# Patient Record
Sex: MALE | Race: WHITE
[De-identification: names, ages, dates, MRNs, and addresses within clinical notes are randomized per-mention and may not be internally consistent; named-entity substitution may affect disease eponyms.]

---

## 2018-08-06 ENCOUNTER — HOSPITAL ENCOUNTER (INPATIENT)
Dept: HOSPITAL 47 - EC | Age: 58
LOS: 2 days | Discharge: HOME | DRG: 184 | End: 2018-08-08
Attending: SURGERY | Admitting: SURGERY
Payer: MEDICARE

## 2018-08-06 DIAGNOSIS — G89.4: ICD-10-CM

## 2018-08-06 DIAGNOSIS — Z79.891: ICD-10-CM

## 2018-08-06 DIAGNOSIS — Z82.5: ICD-10-CM

## 2018-08-06 DIAGNOSIS — M48.061: ICD-10-CM

## 2018-08-06 DIAGNOSIS — K21.9: ICD-10-CM

## 2018-08-06 DIAGNOSIS — S22.42XA: ICD-10-CM

## 2018-08-06 DIAGNOSIS — Z79.899: ICD-10-CM

## 2018-08-06 DIAGNOSIS — M19.042: ICD-10-CM

## 2018-08-06 DIAGNOSIS — I10: ICD-10-CM

## 2018-08-06 DIAGNOSIS — Z79.82: ICD-10-CM

## 2018-08-06 DIAGNOSIS — F17.200: ICD-10-CM

## 2018-08-06 DIAGNOSIS — Z96.643: ICD-10-CM

## 2018-08-06 DIAGNOSIS — M19.041: ICD-10-CM

## 2018-08-06 DIAGNOSIS — R40.2362: ICD-10-CM

## 2018-08-06 DIAGNOSIS — R40.2142: ICD-10-CM

## 2018-08-06 DIAGNOSIS — S00.83XA: ICD-10-CM

## 2018-08-06 DIAGNOSIS — Z88.2: ICD-10-CM

## 2018-08-06 DIAGNOSIS — S22.22XA: Primary | ICD-10-CM
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
## 2018-08-06 DIAGNOSIS — N40.0: ICD-10-CM

## 2018-08-06 DIAGNOSIS — R40.2252: ICD-10-CM
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
## 2018-08-06 LAB
ALBUMIN SERPL-MCNC: 4.1 G/DL (ref 3.5–5)
ALP SERPL-CCNC: 60 U/L (ref 38–126)
ALT SERPL-CCNC: 40 U/L (ref 21–72)
ANION GAP SERPL CALC-SCNC: 7 MMOL/L
APTT BLD: 22.3 SEC (ref 22–30)
AST SERPL-CCNC: 38 U/L (ref 17–59)
BASOPHILS # BLD AUTO: 0 K/UL (ref 0–0.2)
BASOPHILS NFR BLD AUTO: 0 %
BUN SERPL-SCNC: 14 MG/DL (ref 9–20)
CALCIUM SPEC-MCNC: 9.2 MG/DL (ref 8.4–10.2)
CHLORIDE SERPL-SCNC: 103 MMOL/L (ref 98–107)
CK SERPL-CCNC: 351 U/L (ref 55–170)
CK SERPL-CCNC: 522 U/L (ref 55–170)
CK SERPL-CCNC: 564 U/L (ref 55–170)
CO2 SERPL-SCNC: 29 MMOL/L (ref 22–30)
EOSINOPHIL # BLD AUTO: 0.1 K/UL (ref 0–0.7)
EOSINOPHIL NFR BLD AUTO: 1 %
ERYTHROCYTE [DISTWIDTH] IN BLOOD BY AUTOMATED COUNT: 5.04 M/UL (ref 4.3–5.9)
ERYTHROCYTE [DISTWIDTH] IN BLOOD: 16.8 % (ref 11.5–15.5)
GLUCOSE BLD-MCNC: 85 MG/DL (ref 75–99)
GLUCOSE SERPL-MCNC: 88 MG/DL (ref 74–99)
HCT VFR BLD AUTO: 43.2 % (ref 39–53)
HGB BLD-MCNC: 14 GM/DL (ref 13–17.5)
INR PPP: 1 (ref ?–1.2)
LYMPHOCYTES # SPEC AUTO: 0.8 K/UL (ref 1–4.8)
LYMPHOCYTES NFR SPEC AUTO: 7 %
MCH RBC QN AUTO: 27.8 PG (ref 25–35)
MCHC RBC AUTO-ENTMCNC: 32.4 G/DL (ref 31–37)
MCV RBC AUTO: 85.8 FL (ref 80–100)
MONOCYTES # BLD AUTO: 0.8 K/UL (ref 0–1)
MONOCYTES NFR BLD AUTO: 7 %
NEUTROPHILS # BLD AUTO: 9.4 K/UL (ref 1.3–7.7)
NEUTROPHILS NFR BLD AUTO: 84 %
PLATELET # BLD AUTO: 199 K/UL (ref 150–450)
POTASSIUM SERPL-SCNC: 4.3 MMOL/L (ref 3.5–5.1)
PROT SERPL-MCNC: 7.1 G/DL (ref 6.3–8.2)
PT BLD: 10.1 SEC (ref 9–12)
SODIUM SERPL-SCNC: 139 MMOL/L (ref 137–145)
TROPONIN I SERPL-MCNC: 0.03 NG/ML (ref 0–0.03)
TROPONIN I SERPL-MCNC: 0.04 NG/ML (ref 0–0.03)
TROPONIN I SERPL-MCNC: <0.012 NG/ML (ref 0–0.03)
WBC # BLD AUTO: 11.2 K/UL (ref 3.8–10.6)

## 2018-08-06 PROCEDURE — 72125 CT NECK SPINE W/O DYE: CPT

## 2018-08-06 PROCEDURE — 71045 X-RAY EXAM CHEST 1 VIEW: CPT

## 2018-08-06 PROCEDURE — 93306 TTE W/DOPPLER COMPLETE: CPT

## 2018-08-06 PROCEDURE — 80320 DRUG SCREEN QUANTALCOHOLS: CPT

## 2018-08-06 PROCEDURE — 72170 X-RAY EXAM OF PELVIS: CPT

## 2018-08-06 PROCEDURE — 71260 CT THORAX DX C+: CPT

## 2018-08-06 PROCEDURE — 82550 ASSAY OF CK (CPK): CPT

## 2018-08-06 PROCEDURE — 84484 ASSAY OF TROPONIN QUANT: CPT

## 2018-08-06 PROCEDURE — 36415 COLL VENOUS BLD VENIPUNCTURE: CPT

## 2018-08-06 PROCEDURE — 84100 ASSAY OF PHOSPHORUS: CPT

## 2018-08-06 PROCEDURE — 80048 BASIC METABOLIC PNL TOTAL CA: CPT

## 2018-08-06 PROCEDURE — 86850 RBC ANTIBODY SCREEN: CPT

## 2018-08-06 PROCEDURE — 85025 COMPLETE CBC W/AUTO DIFF WBC: CPT

## 2018-08-06 PROCEDURE — 85730 THROMBOPLASTIN TIME PARTIAL: CPT

## 2018-08-06 PROCEDURE — 86901 BLOOD TYPING SEROLOGIC RH(D): CPT

## 2018-08-06 PROCEDURE — 86900 BLOOD TYPING SEROLOGIC ABO: CPT

## 2018-08-06 PROCEDURE — 93005 ELECTROCARDIOGRAM TRACING: CPT

## 2018-08-06 PROCEDURE — 74177 CT ABD & PELVIS W/CONTRAST: CPT

## 2018-08-06 PROCEDURE — 83735 ASSAY OF MAGNESIUM: CPT

## 2018-08-06 PROCEDURE — 70450 CT HEAD/BRAIN W/O DYE: CPT

## 2018-08-06 PROCEDURE — 80053 COMPREHEN METABOLIC PANEL: CPT

## 2018-08-06 PROCEDURE — 96374 THER/PROPH/DIAG INJ IV PUSH: CPT

## 2018-08-06 PROCEDURE — 82553 CREATINE MB FRACTION: CPT

## 2018-08-06 PROCEDURE — 99291 CRITICAL CARE FIRST HOUR: CPT

## 2018-08-06 PROCEDURE — 85610 PROTHROMBIN TIME: CPT

## 2018-08-06 RX ADMIN — GABAPENTIN SCH MG: 300 CAPSULE ORAL at 20:21

## 2018-08-06 RX ADMIN — HYDROCODONE BITARTRATE AND ACETAMINOPHEN PRN EACH: 10; 325 TABLET ORAL at 16:12

## 2018-08-06 RX ADMIN — MORPHINE SULFATE PRN MG: 4 INJECTION, SOLUTION INTRAMUSCULAR; INTRAVENOUS at 22:52

## 2018-08-06 RX ADMIN — GABAPENTIN SCH MG: 300 CAPSULE ORAL at 16:10

## 2018-08-06 RX ADMIN — MORPHINE SULFATE PRN MG: 4 INJECTION, SOLUTION INTRAMUSCULAR; INTRAVENOUS at 14:32

## 2018-08-06 RX ADMIN — CEFAZOLIN SCH MLS/HR: 330 INJECTION, POWDER, FOR SOLUTION INTRAMUSCULAR; INTRAVENOUS at 15:31

## 2018-08-06 RX ADMIN — MORPHINE SULFATE PRN MG: 4 INJECTION, SOLUTION INTRAMUSCULAR; INTRAVENOUS at 10:21
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
## 2018-08-07 LAB
ANION GAP SERPL CALC-SCNC: 6 MMOL/L
BASOPHILS # BLD AUTO: 0 K/UL (ref 0–0.2)
BASOPHILS NFR BLD AUTO: 0 %
BUN SERPL-SCNC: 12 MG/DL (ref 9–20)
CALCIUM SPEC-MCNC: 8.7 MG/DL (ref 8.4–10.2)
CHLORIDE SERPL-SCNC: 102 MMOL/L (ref 98–107)
CO2 SERPL-SCNC: 29 MMOL/L (ref 22–30)
EOSINOPHIL # BLD AUTO: 0 K/UL (ref 0–0.7)
EOSINOPHIL NFR BLD AUTO: 0 %
ERYTHROCYTE [DISTWIDTH] IN BLOOD BY AUTOMATED COUNT: 4.95 M/UL (ref 4.3–5.9)
ERYTHROCYTE [DISTWIDTH] IN BLOOD: 16.6 % (ref 11.5–15.5)
GLUCOSE SERPL-MCNC: 123 MG/DL (ref 74–99)
HCT VFR BLD AUTO: 43.3 % (ref 39–53)
HGB BLD-MCNC: 13.4 GM/DL (ref 13–17.5)
LYMPHOCYTES # SPEC AUTO: 0.7 K/UL (ref 1–4.8)
LYMPHOCYTES NFR SPEC AUTO: 7 %
MAGNESIUM SPEC-SCNC: 1.8 MG/DL (ref 1.6–2.3)
MCH RBC QN AUTO: 27 PG (ref 25–35)
MCHC RBC AUTO-ENTMCNC: 30.9 G/DL (ref 31–37)
MCV RBC AUTO: 87.4 FL (ref 80–100)
MONOCYTES # BLD AUTO: 0.3 K/UL (ref 0–1)
MONOCYTES NFR BLD AUTO: 3 %
NEUTROPHILS # BLD AUTO: 8.6 K/UL (ref 1.3–7.7)
NEUTROPHILS NFR BLD AUTO: 89 %
PLATELET # BLD AUTO: 173 K/UL (ref 150–450)
POTASSIUM SERPL-SCNC: 4.5 MMOL/L (ref 3.5–5.1)
SODIUM SERPL-SCNC: 137 MMOL/L (ref 137–145)
WBC # BLD AUTO: 9.6 K/UL (ref 3.8–10.6)

## 2018-08-07 RX ADMIN — HYDROCODONE BITARTRATE AND ACETAMINOPHEN PRN EACH: 10; 325 TABLET ORAL at 08:00

## 2018-08-07 RX ADMIN — GABAPENTIN SCH MG: 300 CAPSULE ORAL at 20:32

## 2018-08-07 RX ADMIN — CEFAZOLIN SCH MLS/HR: 330 INJECTION, POWDER, FOR SOLUTION INTRAMUSCULAR; INTRAVENOUS at 18:18

## 2018-08-07 RX ADMIN — HYDROCODONE BITARTRATE AND ACETAMINOPHEN PRN EACH: 10; 325 TABLET ORAL at 22:06

## 2018-08-07 RX ADMIN — HYDROCODONE BITARTRATE AND ACETAMINOPHEN PRN EACH: 10; 325 TABLET ORAL at 14:02

## 2018-08-07 RX ADMIN — MAGNESIUM SULFATE IN DEXTROSE SCH MLS/HR: 10 INJECTION, SOLUTION INTRAVENOUS at 07:49

## 2018-08-07 RX ADMIN — MORPHINE SULFATE SCH MG: 60 TABLET, EXTENDED RELEASE ORAL at 20:32

## 2018-08-07 RX ADMIN — TAMSULOSIN HYDROCHLORIDE SCH MG: 0.4 CAPSULE ORAL at 08:00

## 2018-08-07 RX ADMIN — PANTOPRAZOLE SODIUM SCH MG: 40 INJECTION, POWDER, FOR SOLUTION INTRAVENOUS at 08:00

## 2018-08-07 RX ADMIN — MORPHINE SULFATE PRN MG: 4 INJECTION, SOLUTION INTRAMUSCULAR; INTRAVENOUS at 03:34

## 2018-08-07 RX ADMIN — MAGNESIUM SULFATE IN DEXTROSE SCH MLS/HR: 10 INJECTION, SOLUTION INTRAVENOUS at 06:24

## 2018-08-07 RX ADMIN — TRIAMTERENE AND HYDROCHLOROTHIAZIDE SCH EACH: 25; 37.5 CAPSULE ORAL at 08:00

## 2018-08-07 RX ADMIN — GABAPENTIN SCH: 300 CAPSULE ORAL at 18:14

## 2018-08-07 RX ADMIN — MORPHINE SULFATE PRN MG: 4 INJECTION, SOLUTION INTRAMUSCULAR; INTRAVENOUS at 18:14

## 2018-08-07 RX ADMIN — GABAPENTIN SCH MG: 300 CAPSULE ORAL at 08:00

## 2018-08-07 RX ADMIN — CEFAZOLIN SCH MLS/HR: 330 INJECTION, POWDER, FOR SOLUTION INTRAMUSCULAR; INTRAVENOUS at 03:33

## 2018-08-07 RX ADMIN — METOPROLOL SUCCINATE SCH MG: 25 TABLET, EXTENDED RELEASE ORAL at 08:00

## 2018-08-07 NOTE — XR
EXAMINATION TYPE: XR chest 1V

 

DATE OF EXAM: 8/7/2018

 

COMPARISON: Prior chest x-ray and CT 8/6/2018

 

HISTORY: Sternal contusion

 

TECHNIQUE: Single frontal view of the chest is obtained.

 

FINDINGS:  There is no focal air space opacity, pleural effusion, or pneumothorax seen.  The cardiac 
silhouette size is within normal limits. Patient's known rib and sternal fractures are not seen on pl
ain film.

 

IMPRESSION:  No acute process.
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
## 2018-08-07 NOTE — ECHOF
Referral Reason:[facility]



MEASUREMENTS

--------

HEIGHT: 185.4 cm

WEIGHT: 93.0 kg

BP: 140/81

IVSd:   1.3 cm     (0.6 - 1.1)

LVIDd:   5.3 cm     (3.9 - 5.3)

LVPWd:   1.4 cm     (0.6 - 1.1)

IVSs:   1.9 cm

LVIDs:   3.5 cm

LVPWs:   1.8 cm

Ao Diam:   3.5 cm     (2.0 - 3.7)

AV Cusp:   2.5 cm     (1.5 - 2.6)

LA Diam:   3.1 cm     (2.7 - 3.8)

MV EXCURSION:   23.254 mm     (> 18.000)

MV EF SLOPE:   74 mm/s     (70 - 150)

EPSS:   3.4 cm

MV E Arturo:   0.65 m/s

MV DecT:   243 ms

MV A Arturo:   0.91 m/s

MV E/A Ratio:   0.72 

AR PHT:   867 ms

RAP:   5.00 mmHg

RVSP:   11.16 mmHg







FINDINGS

--------

Sinus rhythm.

This was a technically good study.

The left ventricular size is normal.   There is mild concentric left ventricular hypertrophy.   Overa
ll left ventricular systolic function is low-normal with, an EF between 50 - 55 %.

The right ventricle is normal in size and function.

The left atrium is normal in size.

The right atrium is normal in size.

There is mild aortic valve sclerosis.   There is mild aortic regurgitation.

The mitral valve leaflets are mildly thickened.   Mild mitral regurgitation is present.

Mild tricuspid regurgitation present.   The right ventricular systolic pressure, as measured by Doppl
er, is 11.16mmHg.

Pulmonic valve appears structurally normal.

The aortic root size is normal.

There is a trivial pericardial effusion present.



CONCLUSIONS

--------

1. Sinus rhythm.

2. This was a technically good study.

3. The left ventricular size is normal.

4. There is mild concentric left ventricular hypertrophy.

5. Overall left ventricular systolic function is low-normal with, an EF between 50 - 55 %.

6. The right ventricle is normal in size and function.

7. The left atrium is normal in size.

8. The right atrium is normal in size.

9. There is mild aortic valve sclerosis.

10. There is mild aortic regurgitation.

11. The mitral valve leaflets are mildly thickened.

12. Mild mitral regurgitation is present.

13. Mild tricuspid regurgitation present.

14. The right ventricular systolic pressure, as measured by Doppler, is 11.16mmHg.

15. Pulmonic valve appears structurally normal.

16. The aortic root size is normal.

17. There is a trivial pericardial effusion present.





SONOGRAPHER: Kori Conti RDCS
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
## 2018-08-08 VITALS — SYSTOLIC BLOOD PRESSURE: 151 MMHG | RESPIRATION RATE: 17 BRPM | DIASTOLIC BLOOD PRESSURE: 89 MMHG | HEART RATE: 62 BPM

## 2018-08-08 VITALS — TEMPERATURE: 97.4 F

## 2018-08-08 LAB
ANION GAP SERPL CALC-SCNC: 3 MMOL/L
BASOPHILS # BLD AUTO: 0 K/UL (ref 0–0.2)
BASOPHILS NFR BLD AUTO: 0 %
BUN SERPL-SCNC: 13 MG/DL (ref 9–20)
CALCIUM SPEC-MCNC: 8.7 MG/DL (ref 8.4–10.2)
CHLORIDE SERPL-SCNC: 103 MMOL/L (ref 98–107)
CO2 SERPL-SCNC: 31 MMOL/L (ref 22–30)
EOSINOPHIL # BLD AUTO: 0 K/UL (ref 0–0.7)
EOSINOPHIL NFR BLD AUTO: 0 %
ERYTHROCYTE [DISTWIDTH] IN BLOOD BY AUTOMATED COUNT: 4.83 M/UL (ref 4.3–5.9)
ERYTHROCYTE [DISTWIDTH] IN BLOOD: 16.4 % (ref 11.5–15.5)
GLUCOSE SERPL-MCNC: 140 MG/DL (ref 74–99)
HCT VFR BLD AUTO: 42.2 % (ref 39–53)
HGB BLD-MCNC: 13.4 GM/DL (ref 13–17.5)
LYMPHOCYTES # SPEC AUTO: 0.6 K/UL (ref 1–4.8)
LYMPHOCYTES NFR SPEC AUTO: 7 %
MAGNESIUM SPEC-SCNC: 1.9 MG/DL (ref 1.6–2.3)
MCH RBC QN AUTO: 27.8 PG (ref 25–35)
MCHC RBC AUTO-ENTMCNC: 31.8 G/DL (ref 31–37)
MCV RBC AUTO: 87.4 FL (ref 80–100)
MONOCYTES # BLD AUTO: 0.4 K/UL (ref 0–1)
MONOCYTES NFR BLD AUTO: 4 %
NEUTROPHILS # BLD AUTO: 8.2 K/UL (ref 1.3–7.7)
NEUTROPHILS NFR BLD AUTO: 88 %
PLATELET # BLD AUTO: 178 K/UL (ref 150–450)
POTASSIUM SERPL-SCNC: 4.2 MMOL/L (ref 3.5–5.1)
SODIUM SERPL-SCNC: 137 MMOL/L (ref 137–145)
WBC # BLD AUTO: 9.4 K/UL (ref 3.8–10.6)

## 2018-08-08 RX ADMIN — GABAPENTIN SCH MG: 300 CAPSULE ORAL at 08:23

## 2018-08-08 RX ADMIN — PANTOPRAZOLE SODIUM SCH MG: 40 INJECTION, POWDER, FOR SOLUTION INTRAVENOUS at 08:23

## 2018-08-08 RX ADMIN — CEFAZOLIN SCH MLS/HR: 330 INJECTION, POWDER, FOR SOLUTION INTRAMUSCULAR; INTRAVENOUS at 08:18

## 2018-08-08 RX ADMIN — METOPROLOL SUCCINATE SCH MG: 25 TABLET, EXTENDED RELEASE ORAL at 08:22

## 2018-08-08 RX ADMIN — MORPHINE SULFATE SCH MG: 60 TABLET, EXTENDED RELEASE ORAL at 08:25

## 2018-08-08 RX ADMIN — TAMSULOSIN HYDROCHLORIDE SCH MG: 0.4 CAPSULE ORAL at 08:22

## 2018-08-08 RX ADMIN — HYDROCODONE BITARTRATE AND ACETAMINOPHEN PRN EACH: 10; 325 TABLET ORAL at 11:59

## 2018-08-08 RX ADMIN — TRIAMTERENE AND HYDROCHLOROTHIAZIDE SCH EACH: 25; 37.5 CAPSULE ORAL at 08:23
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
## 2018-08-08 NOTE — P.PN
Progress Note - Text


Progress Note Date: 08/07/18





The patient is doing fairly well.  He still has complaints of sternal pain.  He'

s had no shortness of breath.  He has no abdominal pain.





On exam his vital signs are stable.  His abdomen soft.





Patient will most likely be transferred out of the ICU today.  We anticipate 

him to be discharged home the next 24-48 hours.
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
## 2018-10-08 ENCOUNTER — HOSPITAL ENCOUNTER (OUTPATIENT)
Dept: HOSPITAL 47 - RADXRMAIN | Age: 58
Discharge: HOME | End: 2018-10-08
Attending: FAMILY MEDICINE
Payer: COMMERCIAL

## 2018-10-08 DIAGNOSIS — S22.22XD: Primary | ICD-10-CM

## 2018-10-08 DIAGNOSIS — S22.42XD: ICD-10-CM

## 2018-10-08 PROCEDURE — 71120 X-RAY EXAM BREASTBONE 2/>VWS: CPT
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
